# Patient Record
Sex: FEMALE | Race: WHITE | URBAN - METROPOLITAN AREA
[De-identification: names, ages, dates, MRNs, and addresses within clinical notes are randomized per-mention and may not be internally consistent; named-entity substitution may affect disease eponyms.]

---

## 2018-11-24 ENCOUNTER — HOSPITAL ENCOUNTER (EMERGENCY)
Age: 38
Discharge: HOME | End: 2018-11-24
Attending: EMERGENCY MEDICINE

## 2018-11-24 VITALS
SYSTOLIC BLOOD PRESSURE: 123 MMHG | DIASTOLIC BLOOD PRESSURE: 87 MMHG | BODY MASS INDEX: 29.25 KG/M2 | HEART RATE: 99 BPM | RESPIRATION RATE: 18 BRPM | WEIGHT: 182 LBS | TEMPERATURE: 97.7 F | OXYGEN SATURATION: 98 % | HEIGHT: 66 IN

## 2018-11-24 PROCEDURE — 4500000002 HC ER NO CHARGE

## 2021-08-17 ENCOUNTER — HOSPITAL ENCOUNTER (EMERGENCY)
Age: 41
Discharge: HOME | End: 2021-08-17
Attending: EMERGENCY MEDICINE
Payer: MEDICARE

## 2021-08-17 ENCOUNTER — APPOINTMENT (OUTPATIENT)
Dept: GENERAL RADIOLOGY | Age: 41
End: 2021-08-17
Payer: MEDICARE

## 2021-08-17 VITALS
HEART RATE: 89 BPM | OXYGEN SATURATION: 98 % | RESPIRATION RATE: 18 BRPM | SYSTOLIC BLOOD PRESSURE: 130 MMHG | DIASTOLIC BLOOD PRESSURE: 85 MMHG | TEMPERATURE: 98.6 F
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
## 2021-08-17 PROCEDURE — 73110 X-RAY EXAM OF WRIST: CPT

## 2021-08-17 PROCEDURE — 99284 EMERGENCY DEPT VISIT MOD MDM: CPT

## 2021-08-17 PROCEDURE — 6370000000 HC RX 637 (ALT 250 FOR IP): Performed by: STUDENT IN AN ORGANIZED HEALTH CARE EDUCATION/TRAINING PROGRAM

## 2021-08-17 PROCEDURE — 73630 X-RAY EXAM OF FOOT: CPT

## 2021-08-17 PROCEDURE — 73560 X-RAY EXAM OF KNEE 1 OR 2: CPT

## 2021-08-17 RX ORDER — ACETAMINOPHEN 325 MG/1
650 TABLET ORAL ONCE
Status: COMPLETED | OUTPATIENT
Start: 2021-08-17 | End: 2021-08-17

## 2021-08-17 RX ORDER — MORPHINE SULFATE 15 MG/1
15 TABLET ORAL ONCE
Status: COMPLETED | OUTPATIENT
Start: 2021-08-17 | End: 2021-08-17

## 2021-08-17 RX ORDER — ACETAMINOPHEN 325 MG/1
650 TABLET ORAL EVERY 6 HOURS PRN
Qty: 20 TABLET | Refills: 0 | Status: SHIPPED | OUTPATIENT
Start: 2021-08-17 | End: 2021-08-22

## 2021-08-17 RX ADMIN — MORPHINE SULFATE 15 MG: 15 TABLET ORAL at 19:00

## 2021-08-17 RX ADMIN — ACETAMINOPHEN 650 MG: 325 TABLET ORAL at 18:31

## 2021-08-17 ASSESSMENT — PAIN SCALES - GENERAL
PAINLEVEL_OUTOF10: 7

## 2021-08-17 ASSESSMENT — PAIN DESCRIPTION - PAIN TYPE: TYPE: ACUTE PAIN

## 2021-08-17 ASSESSMENT — PAIN DESCRIPTION - ORIENTATION: ORIENTATION: LEFT

## 2021-08-17 NOTE — ED PROVIDER NOTES
1 [facility]  EMERGENCY DEPARTMENT ENCOUNTER          [facility] RESIDENT NOTE       Date of evaluation: 8/17/2021    Chief Complaint     Fall (fall in [place] of dollar tree, left wrist, knee, foot pain), Wrist Pain, Knee Pain, and Foot Pain    History of Present Illness     Sylvia Rodriguez is a 37 y.o. female with past medical history of borderline personality disorder, asthma, COPD who presents to the ED with complaint of mechanical fall at the [facility]. Patient claims she was walking down the house and all of a sudden she started slipping she tried to grab onto the shoulders but could not find anything solid to grab onto and had a fall. Patient endorses she fell on her left side with pain in her left wrist, left knee, left foot. Patient endorses constant pain after the fall. Patient has not tried anything since the fall in terms of pain relief. Patient while in the room had ice packs over her left knee and her left foot. Patient denies any head trauma or LOC. Patient denies any fever or chills, nausea or vomiting, chest pain, shortness of breath, abdominal pain, lower extremity swelling. Review of Systems     Review of Systems  -Negative except HPI    Past Medical, Surgical, Family, and Social History     She has a past medical history of Asthma, Borderline personality, MI, old, and Schizo affective schizophrenia ([facility] [address] She has a past surgical history that includes Tubal ligation; Foot surgery (Bilateral); and eye surgery (Bilateral). Her family history is not on file. She reports that she has been smoking cigarettes. She has been smoking about 1.50 packs per day. She has never used smokeless tobacco. She reports that she does not drink alcohol and does not use drugs.     Medications     Previous Medications    IBUPROFEN (MIDOL) 200 MG CAPS    Take 200 mg by mouth    UNKNOWN TO PATIENT    For night terrors - pt does not know the name    ZIPRASIDONE (GEODON) 60 MG CAPSULE    Take 60 mg by mouth 2 times daily (with meals). Allergies     She is allergic to garlic. Physical Exam     INITIAL VITALS: BP: 130/85, Temp: 98.6 °F (37 °C), Pulse: 89, Resp: 18, SpO2: 98 %   Physical Exam  Constitutional:       Appearance: She is obese. HENT:      Head: Normocephalic and atraumatic. Mouth/Throat:      Mouth: Mucous membranes are moist.   Eyes:      General: No scleral icterus. Extraocular Movements: Extraocular movements intact. Pupils: Pupils are equal, round, and reactive to light. Cardiovascular:      Rate and Rhythm: Regular rhythm. Tachycardia present. Pulses: Normal pulses. Heart sounds: Normal heart sounds. No murmur heard. No friction rub. No gallop. Pulmonary:      Effort: Pulmonary effort is normal. No respiratory distress. Breath sounds: Normal breath sounds. No wheezing or rales. Abdominal:      General: Bowel sounds are normal. There is no distension. Palpations: Abdomen is soft. Tenderness: There is no abdominal tenderness. There is no guarding. Musculoskeletal:      Right lower leg: No edema. Left lower leg: No edema. Comments: Tenderness to palpation on the patella and the lateral aspect of the patella bone. Patient can lift her left lower extremity about 4 inches of the bed  Patient endorses her sensation is intact bilaterally  Abrasion on the left knee no bryan swelling noted   Neurological:      Mental Status: She is alert and oriented to person, place, and time. Psychiatric:         Mood and Affect: Mood normal.         Behavior: Behavior normal.         Diagnostic Results     EKG   -Not done    RADIOLOGY:  XR FOOT LEFT (MIN 3 VIEWS)   Final Result      No sign of any acute fracture or radiopaque foreign body. XR KNEE LEFT (1-2 VIEWS)   Final Result      No sign of any fracture or acute deformity.        Minimal medial compartment narrowing      XR WRIST LEFT (MIN 3 VIEWS)   Final Result      No sign of any fracture or deformity involving the left wrist.                 LABS:   No results found for this visit on 08/17/21. RECENT VITALS:  BP: 130/85, Temp: 98.6 °F (37 °C),Pulse: 89, Resp: 18, SpO2: 98 %     Procedures     None    ED Course     Nursing Notes, Past Medical Hx, Past Surgical Hx, Social Hx, Allergies, and FamilyHx were reviewed. The patient was giventhe following medications:  Orders Placed This Encounter   Medications    acetaminophen (TYLENOL) tablet 650 mg       CONSULTS:  None    [address] / ASSESSMENT / [place] is a 37 y.o. female with past medical history of borderline personality disorder, asthma, COPD who presents after a mechanical fall at the [facility]. Patient mostly fell on her left side and immediately had pain in her left wrist left foot and left knee. Patient endorses she had immediate pain mostly in her left knee. Upon examination, patient does have pain in her left knee but she is neurovascularly intact. Patient does not have any loss of sensation. Patient has difficulty lifting her left lower extremity up off the bed and the limiting factor is the pain she is in. Patient is currently is applying ice on her knee. Imaging for work-up included XR left wrist, left knee, left foot. Were all negative. Patient was given Tylenol for pain and 15 mg p.o. morphine for pain. Patient was discharged home with 5 days of 650 mg every 6 hours as needed Tylenol and Voltaren gel as needed. Prior to patient discharge, we tried to get the patient to ambulate on her own but she could not do that. Patient refused to get let us image her left hip if she can ambulate because she has had swelling procedures and I have been in always hurt and patient did not want to return over for x-ray. Therefore, patient was provided with crutches and discharged with pain medication as well as instructions to see her PCP and orthopedic surgery.     This patient was also evaluated by the attending physician. All care plans were discussed and agreed upon. Clinical Impression     1. Fall, initial encounter        Disposition     PATIENT REFERRED TO:  No follow-up provider specified.     DISCHARGE MEDICATIONS:  New Prescriptions    No medications on file       Clifton Wu MD  Resident  08/17/21 1026       Elida Campbell MD  Resident  08/17/21 2100

## 2021-08-17 NOTE — ED TRIAGE NOTES
Pt arrived to ED with left wrist, knee, and foot pain after [place]. Denies hitting head, denies LOC.

## 2024-09-04 ENCOUNTER — HOSPITAL ENCOUNTER (EMERGENCY)
Age: 44
Discharge: HOME | End: 2024-09-04
Payer: MEDICARE

## 2024-09-04 VITALS
HEIGHT: 66 IN | DIASTOLIC BLOOD PRESSURE: 85 MMHG | HEART RATE: 99 BPM | BODY MASS INDEX: 29.25 KG/M2 | WEIGHT: 182 LBS | OXYGEN SATURATION: 95 % | RESPIRATION RATE: 20 BRPM | TEMPERATURE: 98 F | SYSTOLIC BLOOD PRESSURE: 130 MMHG

## 2024-09-04 DIAGNOSIS — K08.89 PAIN, DENTAL: Primary | ICD-10-CM

## 2024-09-04 DIAGNOSIS — K02.9 DENTAL DECAY: ICD-10-CM

## 2024-09-04 PROCEDURE — 99283 EMERGENCY DEPT VISIT LOW MDM: CPT

## 2024-09-04 PROCEDURE — 6370000000 HC RX 637 (ALT 250 FOR IP): Performed by: PHYSICIAN ASSISTANT

## 2024-09-04 RX ORDER — PENICILLIN V POTASSIUM 250 MG/1
500 TABLET, FILM COATED ORAL ONCE
Status: COMPLETED | OUTPATIENT
Start: 2024-09-04 | End: 2024-09-04

## 2024-09-04 RX ORDER — PENICILLIN V POTASSIUM 500 MG/1
500 TABLET, FILM COATED ORAL 4 TIMES DAILY
Qty: 28 TABLET | Refills: 0 | Status: SHIPPED | OUTPATIENT
Start: 2024-09-04 | End: 2024-09-11

## 2024-09-04 RX ADMIN — PENICILLIN V POTASSIUM 500 MG: 250 TABLET, FILM COATED ORAL at 12:03

## 2024-09-04 ASSESSMENT — PAIN - FUNCTIONAL ASSESSMENT: PAIN_FUNCTIONAL_ASSESSMENT: 0-10

## 2024-09-04 ASSESSMENT — PAIN DESCRIPTION - LOCATION: LOCATION: MOUTH

## 2024-09-04 ASSESSMENT — PAIN SCALES - GENERAL: PAINLEVEL_OUTOF10: 10

## 2024-09-04 ASSESSMENT — PAIN DESCRIPTION - ORIENTATION: ORIENTATION: LEFT

## 2024-09-04 NOTE — DISCHARGE INSTRUCTIONS
any questions concerning these instructions, call the emergency department at [facility] @ [phone].    Consult your care giver regarding these instructions and seek your physician’s advice regarding medical care.        Dental Referrals  Following is a list of local clinics that treat dental problems.  Many also have specific emergency hours.  For an appointment or for hours of operation, contact the clinic.      General Information  [facility]  [phone]  [facility] [place]  [address]  [place], [place]  [phone]    [facility] (referral agency)  [address] [place] [address], [address]  [place] [address]  [phone] or [phone]  (Application Process, Must Qualify)   [facility]  [phone]         [facility] Department Clinics:      [facility]  [address] [place] [address]  [phone]     [facility]  [address] [place] [id number] [phone]  [facility]    [address] [place] [address]  [phone]. [phone]    [facility]  [address] [place] [address]  [phone]  [facility]  [address] [place], [place] [address] [phone]    [facility]  [address]  [phone]  [facility]  [address] [place] [id number] [phone] or [phone]  (Must qualify)        Clinics:      [facility] [place]    [address] [place], [place] [id number] [phone]  [facility] [place]  [address] [place] [address] [phone]    [facility]  [address] [place] [address]  [phone]  [facility] [place]  [address] [facility] [place] [address] [phone]

## 2024-09-04 NOTE — ED PROVIDER NOTES
[facility]  ED  eMERGENCY dEPARTMENT eNCOUnter        Pt Name: Leatha Childs  MRN: [id number]  Birthdate 1980  Date of evaluation: 9/4/2024  Provider: COURTNEY CORONADO PA-C  PCP: Ranulfo Rodriguez MD  ED Attending: MD Janeth    RUDDY patient. This patient was not seen by the ED attending, though they were available to consult.  History is provided by the patient. No limitations.     CHIEF COMPLAINT:  Dental Pain (Pt reporting dental pain that started yesterday )      HISTORY OF PRESENT ILLNESS:  Leatha Childs is a 44 y.o. female who presents to the ED via private vehicle with complaints of dental pain.  Patient is here with a toothache to the left lower jaw that has been bothering her for months though became much worse yesterday.  She rates the pain 10/10.  She tried over-the-counter analgesics last night without any help.  She does not have any associated swelling, trouble swallowing or breathing.  She does not have a dentist and has not really made efforts to get a dentist appointment.  She is a smoker.  No other complaints, modifying factors or associated symptoms.     Nursing notes reviewed.   Past Medical History:   Diagnosis Date    Asthma     Borderline personality     MI, old     Schizo affective schizophrenia (HCC)      Past Surgical History:   Procedure Laterality Date    EYE SURGERY Bilateral     cataract removal    FOOT SURGERY Bilateral     TUBAL LIGATION       No family history on file.  Social History     Socioeconomic History    Marital status: Single     Spouse name: Not on file    Number of children: Not on file    Years of education: Not on file    Highest education level: Not on file   Occupational History    Not on file   Tobacco Use    Smoking status: Some Days     Current packs/day: 1.50     Types: Cigarettes    Smokeless tobacco: Never   Substance and Sexual Activity    Alcohol use: No    Drug use: No    Sexual activity: Yes     Partners: Male   Other Topics

## 2025-06-21 ENCOUNTER — HOSPITAL ENCOUNTER (EMERGENCY)
Age: 45
Discharge: HOME | End: 2025-06-21
Attending: EMERGENCY MEDICINE
Payer: MEDICARE

## 2025-06-21 ENCOUNTER — APPOINTMENT (OUTPATIENT)
Dept: GENERAL RADIOLOGY | Age: 45
End: 2025-06-21
Payer: MEDICARE

## 2025-06-21 VITALS
RESPIRATION RATE: 12 BRPM | SYSTOLIC BLOOD PRESSURE: 122 MMHG | HEART RATE: 90 BPM | BODY MASS INDEX: 35.36 KG/M2 | DIASTOLIC BLOOD PRESSURE: 70 MMHG | HEIGHT: 66 IN | OXYGEN SATURATION: 96 % | TEMPERATURE: 99.1 F | WEIGHT: 220 LBS

## 2025-06-21 DIAGNOSIS — M79.675 PAIN OF TOE OF LEFT FOOT: ICD-10-CM

## 2025-06-21 DIAGNOSIS — M25.572 ACUTE LEFT ANKLE PAIN: Primary | ICD-10-CM

## 2025-06-21 PROCEDURE — 99283 EMERGENCY DEPT VISIT LOW MDM: CPT

## 2025-06-21 PROCEDURE — 73610 X-RAY EXAM OF ANKLE: CPT

## 2025-06-21 PROCEDURE — 73630 X-RAY EXAM OF FOOT: CPT

## 2025-06-21 PROCEDURE — 6370000000 HC RX 637 (ALT 250 FOR IP): Performed by: EMERGENCY MEDICINE

## 2025-06-21 RX ORDER — IBUPROFEN 600 MG/1
600 TABLET, FILM COATED ORAL EVERY 8 HOURS PRN
Qty: 30 TABLET | Refills: 0 | Status: SHIPPED | OUTPATIENT
Start: 2025-06-21

## 2025-06-21 RX ORDER — HYDROCODONE BITARTRATE AND ACETAMINOPHEN 5; 325 MG/1; MG/1
1 TABLET ORAL ONCE
Status: COMPLETED | OUTPATIENT
Start: 2025-06-21 | End: 2025-06-21

## 2025-06-21 RX ORDER — IBUPROFEN 600 MG/1
600 TABLET, FILM COATED ORAL ONCE
Status: COMPLETED | OUTPATIENT
Start: 2025-06-21 | End: 2025-06-21

## 2025-06-21 RX ADMIN — IBUPROFEN 600 MG: 600 TABLET ORAL at 13:39

## 2025-06-21 RX ADMIN — HYDROCODONE BITARTRATE AND ACETAMINOPHEN 1 TABLET: 5; 325 TABLET ORAL at 13:40

## 2025-06-21 ASSESSMENT — PAIN DESCRIPTION - PAIN TYPE: TYPE: ACUTE PAIN

## 2025-06-21 ASSESSMENT — PAIN - FUNCTIONAL ASSESSMENT
PAIN_FUNCTIONAL_ASSESSMENT: 0-10
PAIN_FUNCTIONAL_ASSESSMENT: PREVENTS OR INTERFERES SOME ACTIVE ACTIVITIES AND ADLS

## 2025-06-21 ASSESSMENT — PAIN SCALES - GENERAL
PAINLEVEL_OUTOF10: 8
PAINLEVEL_OUTOF10: 8

## 2025-06-21 ASSESSMENT — PAIN DESCRIPTION - ONSET: ONSET: ON-GOING

## 2025-06-21 ASSESSMENT — PAIN DESCRIPTION - LOCATION: LOCATION: ANKLE

## 2025-06-21 ASSESSMENT — PAIN DESCRIPTION - DESCRIPTORS: DESCRIPTORS: DISCOMFORT

## 2025-06-21 ASSESSMENT — PAIN DESCRIPTION - ORIENTATION: ORIENTATION: LEFT

## 2025-06-21 ASSESSMENT — PAIN DESCRIPTION - FREQUENCY: FREQUENCY: CONTINUOUS

## 2025-06-21 NOTE — DISCHARGE INSTRUCTIONS
Take Tylenol or ibuprofen as needed for pain.  Use ice for swelling.    Follow-up with your primary doctor in 3 to 5 days for any other concerns.  If pain is not improving over the next 2 weeks, call orthopedics for repeat assessment along with possible repeat x-ray or MRI.    Return to the emergency department for any worsening foot pain with new numbness or weakness in the foot, inability to walk, development of chest pain with shortness of breath, or any other concerns over the next 12 to 24 hours.

## 2025-06-21 NOTE — ED PROVIDER NOTES
[facility] EMERGENCY DEPARTMENT  EMERGENCY DEPARTMENT ENCOUNTER        Pt Name: Leatha Childs  MRN: [id number]  Birthdate 1980  Date of evaluation: 6/21/2025  Provider: Shay Marcial MD  PCP: Ranulfo Rodriguez MD      CHIEF COMPLAINT       Chief Complaint   Patient presents with    Ankle Pain     Left \"rolled\"        HISTORY OFPRESENT ILLNESS   (Location/Symptom, Timing/Onset, Context/Setting, Quality, Duration, Modifying Factors,Severity)  Note limiting factors.     Leatha Childs is a 44 y.o. female presenting today due to concern for rolling her left ankle just prior to arrival in [facility] and having trouble walking since that time.  She also was complaining of little toe pain and swelling.  She denies hitting her head or passing out.  She has a history of schizophrenia but denies any concerns with this currently and denies any suicidal thoughts.  She denies any chest pain or shortness of breath.  She denies any knee complaints or hip pain.  No reported right leg complaints.  She denies any headache or neck pain.  Due to concern for significant left ankle pain and having a prior injury to this ankle years ago, she came to the emergency department by EMS for further evaluation.  She denies any allergies to medications.  She does have some weakness involving the left foot and ankle due to the pain but otherwise denies any numbness or weakness elsewhere on the body.  She denied feeling lightheaded or dizzy before the episode and just twisted her left foot and fell.                Review of Systems:     Positives and Pertinent negatives as per HPI.      PASTMEDICAL HISTORY     Past Medical History:   Diagnosis Date    Asthma     Borderline personality     MI, old     Schizo affective schizophrenia (HCC)          SURGICAL HISTORY       Past Surgical History:   Procedure Laterality Date    EYE SURGERY Bilateral     cataract removal    FOOT SURGERY Bilateral     TUBAL LIGATION           CURRENT  Yes     Partners: Male       SCREENINGS    Paty Coma Scale  Eye Opening: Spontaneous  Best Verbal Response: Oriented  Best Motor Response: Obeys commands  Paty Coma Scale Score: 15           PHYSICAL EXAM    (up to 7 for level 4, 8 or more for level 5)     ED Triage Vitals [06/21/25 1255]   BP Systolic BP Percentile Diastolic BP Percentile Temp Temp Source Pulse Respirations SpO2   122/70 -- -- 99.1 °F (37.3 °C) Oral 90 12 96 %      Height Weight - Scale         1.676 m (5' 6\") 99.8 kg (220 lb)             Physical Exam        DIAGNOSTIC RESULTS   :    Labs Reviewed - No data to display    All other labs were within normal range or not returned asof this dictation.    EKG: All EKG's are interpreted by the Emergency Department Physician who either signs or Co-signs this chart in the absence of a cardiologist.        RADIOLOGY:   Non-plain film images such as CT, Ultrasound and MRI are read by the radiologist. Plainradiographic images are visualized and preliminarily interpreted by the  ED Provider with the belowfindings:        Interpretation per the Radiologist below, if available at the time of this note:    No orders to display         PROCEDURES   Unless otherwise noted below, none     Procedures    CRITICAL CARE TIME   N/A    CONSULTS:  None    EMERGENCY DEPARTMENT COURSE and DIFFERENTIAL DIAGNOSIS/MDM:   Vitals:    Vitals:    06/21/25 1255   BP: 122/70   Pulse: 90   Resp: 12   Temp: 99.1 °F (37.3 °C)   TempSrc: Oral   SpO2: 96%   Weight: 99.8 kg (220 lb)   Height: 1.676 m (5' 6\")       Patient was given the following medications:  Medications - No data to display          I was the primary provider for the patient.      The patient tolerated their visit well.   The patient and / or the family were informed of the results of any tests, a time was given to answer questions.    FINAL IMPRESSION    No diagnosis found.      DISPOSITION/PLAN   DISPOSITION                 PATIENT REFERRED TO:  No follow-up